# Patient Record
Sex: MALE | Race: WHITE
[De-identification: names, ages, dates, MRNs, and addresses within clinical notes are randomized per-mention and may not be internally consistent; named-entity substitution may affect disease eponyms.]

---

## 2019-01-30 ENCOUNTER — HOSPITAL ENCOUNTER (EMERGENCY)
Dept: HOSPITAL 10 - E/R | Age: 19
Discharge: HOME | End: 2019-01-30
Payer: COMMERCIAL

## 2019-01-30 ENCOUNTER — HOSPITAL ENCOUNTER (EMERGENCY)
Dept: HOSPITAL 91 - E/R | Age: 19
Discharge: HOME | End: 2019-01-30
Payer: COMMERCIAL

## 2019-01-30 VITALS
BODY MASS INDEX: 28.47 KG/M2 | HEIGHT: 68 IN | HEIGHT: 68 IN | WEIGHT: 187.83 LBS | BODY MASS INDEX: 28.47 KG/M2 | WEIGHT: 187.83 LBS

## 2019-01-30 VITALS — HEART RATE: 75 BPM | DIASTOLIC BLOOD PRESSURE: 77 MMHG | RESPIRATION RATE: 15 BRPM | SYSTOLIC BLOOD PRESSURE: 123 MMHG

## 2019-01-30 DIAGNOSIS — F17.210: ICD-10-CM

## 2019-01-30 DIAGNOSIS — R11.0: ICD-10-CM

## 2019-01-30 DIAGNOSIS — R10.11: Primary | ICD-10-CM

## 2019-01-30 LAB
ADD MAN DIFF?: NO
ADD UMIC: NO
ALANINE AMINOTRANSFERASE: 22 IU/L (ref 13–69)
ALBUMIN/GLOBULIN RATIO: 1.58
ALBUMIN: 4.6 G/DL (ref 3.3–4.9)
ALKALINE PHOSPHATASE: 63 IU/L (ref 42–121)
ANION GAP: 15 (ref 5–13)
ASPARTATE AMINO TRANSFERASE: 23 IU/L (ref 15–46)
BASOPHIL #: 0.1 10^3/UL (ref 0–0.1)
BASOPHILS %: 0.9 % (ref 0–2)
BILIRUBIN,DIRECT: 0 MG/DL (ref 0–0.2)
BILIRUBIN,TOTAL: 0.3 MG/DL (ref 0.2–1.3)
BLOOD UREA NITROGEN: 23 MG/DL (ref 7–20)
CALCIUM: 9.2 MG/DL (ref 8.4–10.2)
CARBON DIOXIDE: 31 MMOL/L (ref 21–31)
CHLORIDE: 96 MMOL/L (ref 97–110)
CREATININE: 0.76 MG/DL (ref 0.61–1.24)
EOSINOPHILS #: 0.3 10^3/UL (ref 0–0.5)
EOSINOPHILS %: 3.1 % (ref 0–7)
GLOBULIN: 2.9 G/DL (ref 1.3–3.2)
GLUCOSE: 103 MG/DL (ref 70–220)
HEMATOCRIT: 45.3 % (ref 42–52)
HEMOGLOBIN: 15.4 G/DL (ref 14–18)
IMMATURE GRANS #M: 0.04 10^3/UL (ref 0–0.03)
IMMATURE GRANS % (M): 0.4 % (ref 0–0.43)
LIPASE: 244 U/L (ref 23–300)
LYMPHOCYTES #: 2.7 10^3/UL (ref 0.8–2.9)
LYMPHOCYTES %: 28.3 % (ref 18–55)
MEAN CORPUSCULAR HEMOGLOBIN: 30.4 PG (ref 29–33)
MEAN CORPUSCULAR HGB CONC: 34 G/DL (ref 32–37)
MEAN CORPUSCULAR VOLUME: 89.3 FL (ref 72–104)
MEAN PLATELET VOLUME: 9 FL (ref 7.4–10.4)
MONOCYTE #: 0.8 10^3/UL (ref 0.3–0.9)
MONOCYTES %: 8.2 % (ref 0–13)
NEUTROPHIL #: 5.7 10^3/UL (ref 1.6–7.5)
NEUTROPHILS %: 59.1 % (ref 30–74)
NUCLEATED RED BLOOD CELLS #: 0 10^3/UL (ref 0–0)
NUCLEATED RED BLOOD CELLS%: 0 /100WBC (ref 0–0)
PLATELET COUNT: 274 10^3/UL (ref 140–415)
POTASSIUM: 4.1 MMOL/L (ref 3.5–5.1)
RED BLOOD COUNT: 5.07 10^6/UL (ref 4.7–6.1)
RED CELL DISTRIBUTION WIDTH: 12.3 % (ref 11.5–14.5)
SODIUM: 142 MMOL/L (ref 135–144)
TOTAL PROTEIN: 7.5 G/DL (ref 6.1–8.1)
UR ASCORBIC ACID: 40 MG/DL
UR BILIRUBIN (DIP): NEGATIVE MG/DL
UR BLOOD (DIP): NEGATIVE MG/DL
UR CLARITY: CLEAR
UR COLOR: YELLOW
UR GLUCOSE (DIP): NEGATIVE MG/DL
UR KETONES (DIP): NEGATIVE MG/DL
UR LEUKOCYTE ESTERASE (DIP): NEGATIVE LEU/UL
UR NITRITE (DIP): NEGATIVE MG/DL
UR PH (DIP): 6 (ref 5–9)
UR SPECIFIC GRAVITY (DIP): 1.03 (ref 1–1.03)
UR TOTAL PROTEIN (DIP): NEGATIVE MG/DL
UR UROBILINOGEN (DIP): (no result) MG/DL
WHITE BLOOD COUNT: 9.6 10^3/UL (ref 4.8–10.8)

## 2019-01-30 PROCEDURE — 96375 TX/PRO/DX INJ NEW DRUG ADDON: CPT

## 2019-01-30 PROCEDURE — 76705 ECHO EXAM OF ABDOMEN: CPT

## 2019-01-30 PROCEDURE — 80053 COMPREHEN METABOLIC PANEL: CPT

## 2019-01-30 PROCEDURE — 36415 COLL VENOUS BLD VENIPUNCTURE: CPT

## 2019-01-30 PROCEDURE — 83690 ASSAY OF LIPASE: CPT

## 2019-01-30 PROCEDURE — 99285 EMERGENCY DEPT VISIT HI MDM: CPT

## 2019-01-30 PROCEDURE — 96374 THER/PROPH/DIAG INJ IV PUSH: CPT

## 2019-01-30 PROCEDURE — 81003 URINALYSIS AUTO W/O SCOPE: CPT

## 2019-01-30 PROCEDURE — 85025 COMPLETE CBC W/AUTO DIFF WBC: CPT

## 2019-01-30 PROCEDURE — 74176 CT ABD & PELVIS W/O CONTRAST: CPT

## 2019-01-30 RX ADMIN — ONDANSETRON HYDROCHLORIDE 1 MG: 2 INJECTION, SOLUTION INTRAMUSCULAR; INTRAVENOUS at 01:36

## 2022-03-14 NOTE — ERD
ER Documentation


Chief Complaint


Chief Complaint





RUQ PAIN X 1 HOUR, NAUSEA





HPI


Patient is a 18-year-old male with no medical problems who presents with 


abdominal pain.  His abdominal pain started just prior to arrival.  He has no 


fevers.  He has nausea but no vomiting or diarrhea.  He tried Tylenol for pain. 


He denies testicular pain or swelling.  Upon review of old medical records this 


is the patient's 12 visit to the ER since 2007.  He does not currently have a 


primary doctor.





ROS


All systems reviewed and are negative except as per history of present illness.





Medications


Home Meds


Active Scripts


Docusate Sodium* (Colace*) 100 Mg Capsule, 100 MG PO TID, #30 CAP


   Prov:DEBORA REED MD         1/30/19


Ondansetron (Ondansetron Odt) 4 Mg Tab.rapdis, 4 MG PO Q6H PRN for NAUSEA AND/OR


VOMITING, #10 TAB


   Prov:DEBORA REED MD         1/30/19


Ibuprofen* (Motrin*) 600 Mg Tab, 600 MG PO Q6H PRN for PAIN AND OR ELEVATED 


TEMP, #30 TAB


   Prov:DEBORA REED MD         1/30/19





Allergies


Allergies:  


Coded Allergies:  


     No Known Allergies (Verified  Allergy, Mild, 10/1/12)





PMhx/Soc


History of Surgery:  Yes (Pancreatitis)


Anesthesia Reaction:  No


Hx Neurological Disorder:  No


Hx Respiratory Disorders:  No


Hx Cardiac Disorders:  No


Hx Psychiatric Problems:  No


Hx Miscellaneous Medical Probl:  No


Hx Alcohol Use:  Yes (Once every month)


Hx Substance Use:  No


Hx Tobacco Use:  Yes


Smoking Status:  Current every day smoker





FmHx


Family History:  No diabetes





Physical Exam


Vitals





Vital Signs


  Date      Temp  Pulse  Resp  B/P (MAP)   Pulse Ox  O2          O2 Flow    FiO2


Time                                                 Delivery    Rate


   1/30/19           80    17      136/55       100  Room Air


     02:00                           (82)


   1/30/19  97.2     97    16      151/72       100


     00:38                           (98)





Physical Exam


Const:   No acute distress


Head:   Atraumatic 


Eyes:    Normal Conjunctiva


ENT:    Normal External Ears, Nose and Mouth.


Neck:               Full range of motion. No meningismus.


Resp:   Clear to auscultation bilaterally


Cardio:   Regular rate and rhythm, no murmurs


Abd:    Soft, right upper and right lower quadrant tenderness to palpation 


without rebound or guarding


Skin:   No petechiae or rashes


Back:   No midline or flank tenderness


Ext:    No cyanosis, or edema


Neur:   Awake and alert


Psych:    Normal Mood and Affect


Result Diagram:  


1/30/19 0127                                                                    


           1/30/19 0127





Results 24 hrs





Laboratory Tests


              Test
                                  1/30/19
01:27


              White Blood Count                       9.6 10^3/ul


              Red Blood Count                        5.07 10^6/ul


              Hemoglobin                                15.4 g/dl


              Hematocrit                                   45.3 %


              Mean Corpuscular Volume                     89.3 fl


              Mean Corpuscular Hemoglobin                 30.4 pg


              Mean Corpuscular Hemoglobin
Concent      34.0 g/dl 



              Red Cell Distribution Width                  12.3 %


              Platelet Count                          274 10^3/UL


              Mean Platelet Volume                         9.0 fl


              Immature Granulocytes %                     0.400 %


              Neutrophils %                                59.1 %


              Lymphocytes %                                28.3 %


              Monocytes %                                   8.2 %


              Eosinophils %                                 3.1 %


              Basophils %                                   0.9 %


              Nucleated Red Blood Cells %             0.0 /100WBC


              Immature Granulocytes #               0.040 10^3/ul


              Neutrophils #                           5.7 10^3/ul


              Lymphocytes #                           2.7 10^3/ul


              Monocytes #                             0.8 10^3/ul


              Eosinophils #                           0.3 10^3/ul


              Basophils #                             0.1 10^3/ul


              Nucleated Red Blood Cells #             0.0 10^3/ul


              Urine Color                          YELLOW


              Urine Clarity                        CLEAR


              Urine pH                                        6.0


              Urine Specific Gravity                        1.027


              Urine Ketones                        NEGATIVE mg/dL


              Urine Nitrite                        NEGATIVE mg/dL


              Urine Bilirubin                      NEGATIVE mg/dL


              Urine Urobilinogen                         1+ mg/dL


              Urine Leukocyte Esterase
            NEGATIVE
Sarah/ul


              Urine Hemoglobin                     NEGATIVE mg/dL


              Urine Glucose                        NEGATIVE mg/dL


              Urine Total Protein                  NEGATIVE mg/dl


              Sodium Level                             142 mmol/L


              Potassium Level                          4.1 mmol/L


              Chloride Level                            96 mmol/L


              Carbon Dioxide Level                      31 mmol/L


              Anion Gap                                        15


              Blood Urea Nitrogen                        23 mg/dl


              Creatinine                               0.76 mg/dl


              Est Glomerular Filtrat Rate
mL/min   > 60 mL/min 



              Glucose Level                             103 mg/dl


              Calcium Level                             9.2 mg/dl


              Total Bilirubin                           0.3 mg/dl


              Direct Bilirubin                         0.00 mg/dl


              Indirect Bilirubin                        0.3 mg/dl


              Aspartate Amino Transf
(AST/SGOT)          23 IU/L 



              Alanine Aminotransferase
(ALT/SGPT)        22 IU/L 



              Alkaline Phosphatase                        63 IU/L


              Total Protein                              7.5 g/dl


              Albumin                                    4.6 g/dl


              Globulin                                  2.90 g/dl


              Albumin/Globulin Ratio                         1.58


              Lipase                                      244 U/L





Current Medications


 Medications
   Dose
          Sig/Jayashree
       Start Time
   Status  Last


 (Trade)       Ordered        Route
 PRN     Stop Time              Admin
Dose


                              Reason                                Admin


 Morphine       4 mg           ONCE  STAT
    1/30/19       DC           1/30/19


Sulfate
                      IV
            01:17
                       01:36



(morphine)                                   1/30/19 01:19


 Ondansetron    4 mg           ONCE  STAT
    1/30/19       DC           1/30/19


HCl
  (Zofran                 IV
            01:17
                       01:36



Inj)                                         1/30/19 01:19








Procedures/MDM


Ultrasound the gallbladder shows a stone but no cholecystitis per radiology.  CT


scan of the abdomen and pelvis showed no surgical process per radiology.





Smoking Cessation Therapy:  Pt. was lectured for greater than  3 minutes on the 


health risks of continued smoking and the benefits of cessation.





Patient is a 18-year-old male who presents with abdominal pain.  Laboratory 


studies were basically normal.  Ultrasound and CT scan showed no surgical 


process at this time.  I doubt appendicitis, cholecystitis, pancreatitis, or 


bowel obstruction.  The patient will be given a prescription for ibuprofen, 


Zofran, and Colace.  He can return for any worsening symptoms.





Departure


Diagnosis:  


   Primary Impression:  


   Abdominal pain


   Abdominal location:  generalized  Qualified Codes:  R10.84 - Generalized 


   abdominal pain


Condition:  Fair


Patient Instructions:  Abdominal Pain


Referrals:  


[facility]


YOU HAVE RECEIVED A MEDICAL SCREENING EXAM AND THE RESULTS INDICATE THAT YOU DO 


NOT HAVE A CONDITION THAT REQUIRES URGENT TREATMENT IN THE EMERGENCY DEPARTMENT.





FURTHER EVALUATION AND TREATMENT OF YOUR CONDITION CAN WAIT UNTIL YOU ARE SEEN 


IN YOUR DOCTORS OFFICE WITHIN THE NEXT 1-2 DAYS. IT IS YOUR RESPONSIBILITY TO 


MAKE AN APPOINTMENT FOR FOLOW-UP CARE.





IF YOU HAVE A PRIMARY DOCTOR


--you should call your primary doctor and schedule an appointment





IF YOU DO NOT HAVE A PRIMARY DOCTOR YOU CAN CALL OUR PHYSICIAN REFERRAL HOTLINE 


AT


 [phone] 





IF YOU CAN NOT AFFORD TO SEE A PHYSICIAN YOU CAN CHOSE FROM THE FOLLOWING CO


Replaced by [facility]





[facility] [phone] [address] [facility] [phone] [address] [facility]. [facility] [phone] [address] [facility] [phone] [address] [facility] [phone] [address] [facility]. [facility] [phone] [address]





Additional Instructions:  


Call your primary care doctor TOMORROW for an appointment during the next 1-2 


days.See the doctor sooner or return here if your condition worsens before your 


appointment time.











DEBORA REED MD                Jan 30, 2019 03:38 Repair Type: Flap